# Patient Record
Sex: FEMALE | Race: WHITE
[De-identification: names, ages, dates, MRNs, and addresses within clinical notes are randomized per-mention and may not be internally consistent; named-entity substitution may affect disease eponyms.]

---

## 2019-04-29 ENCOUNTER — HOSPITAL ENCOUNTER (EMERGENCY)
Dept: HOSPITAL 36 - ER | Age: 34
Discharge: HOME | End: 2019-04-29
Payer: COMMERCIAL

## 2019-04-29 DIAGNOSIS — Z98.890: ICD-10-CM

## 2019-04-29 DIAGNOSIS — G44.209: Primary | ICD-10-CM

## 2019-04-29 DIAGNOSIS — R42: ICD-10-CM

## 2019-04-29 DIAGNOSIS — K21.9: ICD-10-CM

## 2019-04-29 LAB
ALBUMIN SERPL-MCNC: 4.5 GM/DL (ref 3.7–5.3)
ALBUMIN/GLOB SERPL: 1.5 {RATIO} (ref 1–1.8)
ALP SERPL-CCNC: 66 U/L (ref 34–104)
ALT SERPL-CCNC: 12 U/L (ref 7–52)
ANION GAP SERPL CALC-SCNC: 12.2 MMOL/L (ref 7–16)
AST SERPL-CCNC: 13 U/L (ref 13–39)
BASOPHILS # BLD AUTO: 0 TH/CUMM (ref 0–0.2)
BASOPHILS NFR BLD AUTO: 0.7 % (ref 0–2)
BILIRUB SERPL-MCNC: 0.4 MG/DL (ref 0.3–1)
BUN SERPL-MCNC: 9 MG/DL (ref 7–25)
CALCIUM SERPL-MCNC: 9.4 MG/DL (ref 8.6–10.3)
CHLORIDE SERPL-SCNC: 107 MEQ/L (ref 98–107)
CO2 SERPL-SCNC: 23.7 MEQ/L (ref 21–31)
CREAT SERPL-MCNC: 0.6 MG/DL (ref 0.6–1.2)
EOSINOPHIL # BLD AUTO: 0 TH/CMM (ref 0.1–0.4)
EOSINOPHIL NFR BLD AUTO: 0.7 % (ref 0–5)
ERYTHROCYTE [DISTWIDTH] IN BLOOD BY AUTOMATED COUNT: 12.7 % (ref 11.5–20)
GLOBULIN SER-MCNC: 3 GM/DL
GLUCOSE SERPL-MCNC: 112 MG/DL (ref 70–105)
HCT VFR BLD CALC: 39.2 % (ref 41–60)
HGB BLD-MCNC: 12.9 GM/DL (ref 12–16)
INR PPP: 0.96 (ref 0.5–1.4)
LYMPHOCYTE AB SER FC-ACNC: 1.5 TH/CMM (ref 1.5–3)
LYMPHOCYTES NFR BLD AUTO: 23.8 % (ref 20–50)
MCH RBC QN AUTO: 27.8 PG (ref 27–31)
MCHC RBC AUTO-ENTMCNC: 33 PG (ref 28–36)
MCV RBC AUTO: 84.4 FL (ref 81–100)
MONOCYTES # BLD AUTO: 0.2 TH/CMM (ref 0.3–1)
MONOCYTES NFR BLD AUTO: 3.2 % (ref 2–10)
NEUTROPHILS # BLD: 4.8 TH/CMM (ref 1.8–8)
NEUTROPHILS NFR BLD AUTO: 71.6 % (ref 40–80)
PLATELET # BLD: 271 TH/CMM (ref 150–400)
PMV BLD AUTO: 8 FL
POTASSIUM SERPL-SCNC: 3.9 MEQ/L (ref 3.5–5.1)
PROTHROMBIN TIME: 10 SECONDS (ref 9.5–11.5)
RBC # BLD AUTO: 4.64 MIL/CMM (ref 3.8–5.1)
SODIUM SERPL-SCNC: 139 MEQ/L (ref 136–145)
WBC # BLD AUTO: 6.5 TH/CMM (ref 4.8–10.8)

## 2019-04-29 PROCEDURE — Z7610: HCPCS

## 2019-04-29 NOTE — ED PHYSICIAN CHART
ED Chief Complaint/HPI





- Patient Information


Date Seen:: 19


Time Seen:: 13:10


Chief Complaint:: Transient lightheadedness.


History of Present Illness:: 





Pt came in by ambulance because she experienced transient lightheadedness at 

about 1220. No fever. Pt had nausea earlier but no vomiting. Pt felt warm in a 

parking lot when her lightheadedness occurred. No LOC. 


Allergies:: 


 Allergies











Allergy/AdvReac Type Severity Reaction Status Date / Time


 


No Known Allergies Allergy   Verified 19 14:12











Vitals:: 


 Vital Signs - 8 hr











  19





  14:12


 


Temp 98.2 F


 


HR 92


 


RR 23


 


/77


 


O2 Sat % 98











Historian:: Patient


Family MD/PCP:: 





Dr. Pickard


LMP:: 





Irregular. Pt is on Depoprovera injectable contraceptive.


Review:: Nurse's Note Reviewed





ED Review of Systems





- Review of Systems


General/Constitutional: No fever, No chills, No weight loss, No weakness, No 

edema, No loss of appetite


Skin: No skin lesions, No rash, No bruising


Head: No headache, Light headed (transient)


Eyes: No loss of vision, No pain


ENT: No earache, No nasal drainage, No sore throat


Neck: No neck pain, No swelling, No stiffness, No mass noted


Cardio Vascular: Chest pain (? in left upper chest that is not exertion 

related. No associated cardiac symptoms.), No palpitations, No PND, No orthopnea

, No edema


Pulmonary: No SOB, No cough, No wheezing


GI: Nausea, No vomiting, No diarrhea, No pain


G/U: No dysuria, Frequency (?), No hematuria


Ob/Gyn: No vaginal discharge, No abnormal vaginal bleed


Musculoskeletal: No bone or joint pain


Endocrine: No polyuria, No polydipsia


Psychiatric: No prior psych history


Hematopoietic: No bruising, No lymphadenopathy


Allergic/Immuno: No urticaria, No angioedema


Neurological: No syncope, No focal symptoms, No weakness, No paresthesia, No 

headache, No confusion





ED Past Medical History





- Past Medical History


Past Medical History: PUD/GERD, Other (IBS)


Family History: Heart disease (in mother), Diabetes Melitus, HTN


Social History: Non Smoker, Alcohol (occasional), No Drug Use, , Employed

, Other (lives with her  and son.)


Surgical History:  (in '13)


Psychiatricy History: None


Medication: Reviewed





Family Medical History





- Family Member


  ** Mother


History Unknown: Yes





ED Physical Exam





- Physical Examination


General/Constitutional: Awake, Well-developed, well-nourished (female), Alert, 

No distress, GCS 15, Non-toxic appearing, Ambulatory


Other Gen/Cons comments:: 





Breathes comfortably, speaks clearly, and interacts appropriately.


Head: Atraumatic


Eyes: Lids, conjuctiva normal, PERRL, EOMI


Skin: No rash, No ecchymosis, No lymphadenopathy


ENMT: External ears, nose nl, TM canals nl, Nasal exam nl, Oropharynx nl


Neck: Nontender, Full ROM w/o pain, No JVD, No nuchal rigidity, No mass


Respiratory: Nl effort/Exclusion, Clear to Auscultation, No Wheeze/Rhonchi/Rales


Cardio Vascular: RRR, No murmur, gallop, rubs, Carotid/Femoral/Distal pulses 

equal bilaterally


GI: No tenderness/rebounding/guarding, No organomegaly, Normal BS's, 

Nondistended, No mass/bruits


Other GI comments:: 





Abdomen is soft.


: No CVA tenderness


Extremities: No tenderness or effusion, Full ROM, normal strength in all 

extremities, No edema


Neuro/Psych: Alert/oriented (oriented x 3), No focal deficits


Misc: Normal back, No paraspinal tenderness





ED Labs/Radiology/EKG Results





- Lab Results


Results: 





 Laboratory Results - last 24 hr











  19





  14:05 15:00 15:00


 


WBC   6.5 


 


RBC   4.64 


 


Hgb   12.9 


 


Hct   39.2 L 


 


MCV   84.4 


 


MCH   27.8 


 


MCHC Differential   33.0 


 


RDW   12.7 


 


Plt Count   271 


 


MPV   8.0 


 


Neutrophils %   71.6 


 


Lymphocytes %   23.8 


 


Monocytes %   3.2 


 


Eosinophils %   0.7 


 


Basophils %   0.7 


 


PT    10.0


 


INR    0.96


 


PTT (Actin FS)    23.4 L


 


Sodium   


 


Potassium   


 


Chloride   


 


Carbon Dioxide   


 


Anion Gap   


 


BUN   


 


Creatinine   


 


Est GFR ( Amer)   


 


Est GFR (Non-Af Amer)   


 


BUN/Creatinine Ratio   


 


Glucose   


 


Calcium   


 


Total Bilirubin   


 


AST   


 


ALT   


 


Alkaline Phosphatase   


 


Troponin I   


 


Total Protein   


 


Albumin   


 


Globulin   


 


Albumin/Globulin Ratio   


 


Urine Pregnancy Test  NEGATIVE  














  19





  15:00 15:00


 


WBC  


 


RBC  


 


Hgb  


 


Hct  


 


MCV  


 


MCH  


 


MCHC Differential  


 


RDW  


 


Plt Count  


 


MPV  


 


Neutrophils %  


 


Lymphocytes %  


 


Monocytes %  


 


Eosinophils %  


 


Basophils %  


 


PT  


 


INR  


 


PTT (Actin FS)  


 


Sodium  139 


 


Potassium  3.9 


 


Chloride  107 


 


Carbon Dioxide  23.7 


 


Anion Gap  12.2 


 


BUN  9 


 


Creatinine  0.6 


 


Est GFR ( Amer)  > 60.0 


 


Est GFR (Non-Af Amer)  > 60.0 


 


BUN/Creatinine Ratio  15.0 


 


Glucose  112 H 


 


Calcium  9.4 


 


Total Bilirubin  0.4 


 


AST  13 


 


ALT  12 


 


Alkaline Phosphatase  66 


 


Troponin I   < 0.01 L


 


Total Protein  7.5 


 


Albumin  4.5 


 


Globulin  3.0 


 


Albumin/Globulin Ratio  1.5 


 


Urine Pregnancy Test  














- Radiology Results


Results: 





CXR (1v): Based on my interpretation, mild scoliosis. NAD. Official report is 

pending.





- EKG Interpretations


EKG Time:: 14:53


Rate & Rhythm: NSR with VR 79.


Comments:: 





Probable LAE.


No acute ischemic changes.





ED Septic Shock





- .


Is Septic Shock (SBP<90, OR Lactate>4 mmol\L) present?: No





- <6hrs of presentation:


Vital Signs: 


 Vital Signs - 8 hr











  19





  14:12


 


Temp 98.2 F


 


HR 92


 


RR 23


 


/77


 


O2 Sat % 98














ED Reassessment (Disposition)





- Reassessment


Reassessment:: 





1645 Pt has been repeatedly evaluated. Pt is comfortable, stable, and is in no 

distress. CXR report is still pending.


1725 Pt feels well and is stable. No chest pain. EKG, CXR, and lab findings 

have been reviewed with pt. Pt requests to go home now and prefers to follow 

with her PCP Dr. Pickard tomorrow. Aftercare instructions have been given. 

Copies of EKG, lab reports, etc. are to be given to pt per nursing staff to 

take to PCP Dr. Pickard for follow-up.





Reassessment Condition:: Improved





- Diagnosis


Diagnosis:: 





Transient lightheadedness. Consider vasovagal reaction. Stable without 

recurrence.


Noncardiac chest pain, resolved. Consider gastrointestinal in origin.


Tension headache. Stable.





- Aftercare/Follow up Instructions


Aftercare/Follow-Up Instructions:: Refer to Discharge Instructions


Notes:: 





Bed rest for today.


Increase oral hydration.


May take Tylenol 500 mg tab one tab po q6h prn pain.


Headache instructions given.


F/U with PCP Dr. Pickard in one day for recheck. Return to ER immediately if 

condition worsens or if any further questions/problems.





- Patient Disposition


Discharge/Transfer:: Home


Time:: 17:35


Condition at Disposition:: Stable, Improved